# Patient Record
Sex: MALE | Race: WHITE | NOT HISPANIC OR LATINO | Employment: UNEMPLOYED | URBAN - NONMETROPOLITAN AREA
[De-identification: names, ages, dates, MRNs, and addresses within clinical notes are randomized per-mention and may not be internally consistent; named-entity substitution may affect disease eponyms.]

---

## 2021-02-17 NOTE — PROGRESS NOTES
Patient: Kaley Shannon    YOB: 1965    Date: 02/22/2021    Primary Care Provider: Cornelio Chase MD    Chief Complaint   Patient presents with   • Mass     Abdominal pain       SUBJECTIVE:    History of present illness: 3 weeks ago the patient had moderate severity epigastric abdominal pain that radiated into the back.  This lasted approximately 1 week.  He was treated with pain medication and a PPI and his symptoms slowly resolved.  Apparently he has not had any symptoms in the last 2 weeks.  No nausea or vomiting.  No lower GI complaints.  He is never had a colonoscopy or EGD in the past.    The following portions of the patient's history were reviewed and updated as appropriate: allergies, current medications, past family history, past medical history, past social history, past surgical history and problem list.      Review of Systems   Constitutional: Negative for chills, fever and unexpected weight change.   HENT: Negative for trouble swallowing and voice change.    Eyes: Negative for visual disturbance.   Respiratory: Negative for apnea, cough, chest tightness and wheezing.    Cardiovascular: Negative for chest pain, palpitations and leg swelling.   Gastrointestinal: Positive for abdominal pain. Negative for abdominal distention, anal bleeding, blood in stool, constipation, diarrhea, nausea, rectal pain and vomiting.   Endocrine: Negative for cold intolerance and heat intolerance.   Genitourinary: Negative for difficulty urinating, dysuria, flank pain and hematuria.   Musculoskeletal: Negative for back pain, gait problem and joint swelling.   Skin: Negative for color change, rash and wound.   Neurological: Negative for dizziness, syncope, speech difficulty, weakness, numbness and headaches.   Hematological: Negative for adenopathy. Does not bruise/bleed easily.   Psychiatric/Behavioral: Negative for confusion. The patient is not nervous/anxious.        Allergies:  No Known  "Allergies    Medications:    Current Outpatient Medications:   •  albuterol sulfate  (90 Base) MCG/ACT inhaler, , Disp: , Rfl:   •  cetirizine (zyrTEC) 10 MG tablet, , Disp: , Rfl:   •  Incruse Ellipta 62.5 MCG/INH aerosol powder , , Disp: , Rfl:   •  ipratropium-albuterol (DUO-NEB) 0.5-2.5 mg/3 ml nebulizer, , Disp: , Rfl:   •  montelukast (SINGULAIR) 10 MG tablet, , Disp: , Rfl:   •  pantoprazole (PROTONIX) 40 MG EC tablet, , Disp: , Rfl:   •  traMADol (ULTRAM) 50 MG tablet, , Disp: , Rfl:     History:  History reviewed. No pertinent past medical history.    History reviewed. No pertinent surgical history.    Family History   Problem Relation Age of Onset   • Cancer Mother    • Cancer Father    • Cancer Sister    • Diabetes Sister        Social History     Tobacco Use   • Smoking status: Former Smoker   • Smokeless tobacco: Never Used   Substance Use Topics   • Alcohol use: Yes     Frequency: Monthly or less   • Drug use: Never        OBJECTIVE:    Vital Signs:   Vitals:    02/22/21 1409   BP: 143/86   Pulse: 79   Temp: 98.7 °F (37.1 °C)   Weight: 85 kg (187 lb 6.3 oz)   Height: 172.7 cm (68\")       Physical Exam:   General Appearance:    Alert, cooperative, in no acute distress   Head:    Normocephalic, without obvious abnormality, atraumatic   Eyes:            Lids and lashes normal, conjunctivae and sclerae normal, no   icterus, no pallor, corneas clear, PERRLA   Ears:    Ears appear intact with no abnormalities noted   Throat:   No oral lesions, no thrush, oral mucosa moist   Neck:   No adenopathy, supple, trachea midline, no thyromegaly, no   carotid bruit, no JVD   Lungs:     Clear to auscultation,respirations regular, even and                  unlabored    Heart:    Regular rhythm and normal rate, normal S1 and S2, no            murmur, no gallop, no rub, no click   Chest Wall:    No abnormalities observed   Abdomen:     Normal bowel sounds, no masses, no organomegaly, soft        non-tender, " non-distended, no guarding, no rebound                tenderness   Extremities:   Moves all extremities well, no edema, no cyanosis, no             redness   Pulses:   Pulses palpable and equal bilaterally   Skin:   No bleeding, bruising or rash   Lymph nodes:   No palpable adenopathy   Neurologic:   Cranial nerves 2 - 12 grossly intact, sensation intact, DTR       present and equal bilaterally     Results Review:   I reviewed the patient's new clinical results.  Reviewed the CT scan including the films personally and agree with the interpretation    Review of Systems was reviewed and confirmed as accurate as documented by the MA.    ASSESSMENT/PLAN:    1. Epigastric pain    2. Abnormal CT scan, gastrointestinal tract        Patient with epigastric pain and now CT scan with a mesenteric mass.  I am not convinced that this mass was causing his pain as I would not expect his symptoms to resolve with his current medication.  He may also have a terminal ileal mass on the CT scan per the radiologist interpretation.  I recommend an EGD as well as a colonoscopy to further evaluate.  I explained the procedures to the patient as well as the risks of bleeding and perforation and they understand the ramifications of these potential complications including operative intervention and they wish to proceed.  As far as the mesenteric mass is concerned I will base any further recommendations on findings on the colonoscopy.  May repeat the CT in 3 to 6 months.    Electronically signed by Isaiah Serna MD  02/22/21

## 2021-02-22 ENCOUNTER — OFFICE VISIT (OUTPATIENT)
Dept: SURGERY | Facility: CLINIC | Age: 56
End: 2021-02-22

## 2021-02-22 VITALS
HEART RATE: 79 BPM | HEIGHT: 68 IN | WEIGHT: 187.39 LBS | DIASTOLIC BLOOD PRESSURE: 86 MMHG | SYSTOLIC BLOOD PRESSURE: 143 MMHG | TEMPERATURE: 98.7 F | BODY MASS INDEX: 28.4 KG/M2

## 2021-02-22 DIAGNOSIS — R93.3 ABNORMAL CT SCAN, GASTROINTESTINAL TRACT: ICD-10-CM

## 2021-02-22 DIAGNOSIS — R10.13 EPIGASTRIC PAIN: Primary | ICD-10-CM

## 2021-02-22 PROCEDURE — 99204 OFFICE O/P NEW MOD 45 MIN: CPT | Performed by: SURGERY

## 2021-02-22 RX ORDER — IPRATROPIUM BROMIDE AND ALBUTEROL SULFATE 2.5; .5 MG/3ML; MG/3ML
SOLUTION RESPIRATORY (INHALATION)
COMMUNITY
Start: 2021-02-17

## 2021-02-22 RX ORDER — MONTELUKAST SODIUM 10 MG/1
TABLET ORAL
COMMUNITY
Start: 2021-02-17

## 2021-02-22 RX ORDER — TRAMADOL HYDROCHLORIDE 50 MG/1
TABLET ORAL
COMMUNITY
Start: 2021-02-05

## 2021-02-22 RX ORDER — ALBUTEROL SULFATE 90 UG/1
AEROSOL, METERED RESPIRATORY (INHALATION)
COMMUNITY
Start: 2021-01-02

## 2021-02-22 RX ORDER — PANTOPRAZOLE SODIUM 40 MG/1
TABLET, DELAYED RELEASE ORAL
COMMUNITY
Start: 2021-02-04

## 2021-02-22 RX ORDER — BISACODYL 5 MG
TABLET, DELAYED RELEASE (ENTERIC COATED) ORAL
Qty: 4 TABLET | Refills: 0 | Status: SHIPPED | OUTPATIENT
Start: 2021-02-22

## 2021-02-22 RX ORDER — POLYETHYLENE GLYCOL 3350 17 G/17G
POWDER, FOR SOLUTION ORAL
Qty: 238 G | Refills: 0 | Status: SHIPPED | OUTPATIENT
Start: 2021-02-22

## 2021-02-22 RX ORDER — CETIRIZINE HYDROCHLORIDE 10 MG/1
TABLET ORAL
COMMUNITY
Start: 2021-02-17

## 2021-03-01 ENCOUNTER — OUTSIDE FACILITY SERVICE (OUTPATIENT)
Dept: SURGERY | Facility: CLINIC | Age: 56
End: 2021-03-01

## 2021-03-01 PROCEDURE — 43239 EGD BIOPSY SINGLE/MULTIPLE: CPT | Performed by: SURGERY

## 2021-03-01 PROCEDURE — 45385 COLONOSCOPY W/LESION REMOVAL: CPT | Performed by: SURGERY

## 2021-03-03 NOTE — PROGRESS NOTES
Patient: Kaley Shannon    YOB: 1965    Date: 03/08/2021    Primary Care Provider: Cornelio Chase MD    Reason for Consultation: Follow-up colonoscopy and EGD    Chief Complaint   Patient presents with   • Follow-up     EGD/colonoscopy       History of present illness: Here in follow-up after his upper and lower endoscopy.  He has a history of epigastric abdominal pain.  He is currently not complaining of any pain.  He states that since he started his PPI his pain had improved.  No lower GI complaints.    The following portions of the patient's history were reviewed and updated as appropriate: allergies, current medications, past family history, past medical history, past social history, past surgical history and problem list.    Review of Systems   Constitutional: Negative for chills, fever and unexpected weight change.   HENT: Negative for trouble swallowing and voice change.    Eyes: Negative for visual disturbance.   Respiratory: Negative for apnea, cough, chest tightness, shortness of breath and wheezing.    Cardiovascular: Negative for chest pain, palpitations and leg swelling.   Gastrointestinal: Negative for abdominal distention, abdominal pain, anal bleeding, blood in stool, constipation, diarrhea, nausea, rectal pain and vomiting.   Endocrine: Negative for cold intolerance and heat intolerance.   Genitourinary: Negative for difficulty urinating, dysuria, flank pain, scrotal swelling and testicular pain.   Musculoskeletal: Negative for back pain, gait problem and joint swelling.   Skin: Negative for color change, rash and wound.   Neurological: Negative for dizziness, syncope, speech difficulty, weakness, numbness and headaches.   Hematological: Negative for adenopathy. Does not bruise/bleed easily.   Psychiatric/Behavioral: Negative for confusion. The patient is not nervous/anxious.        Allergies:  No Known Allergies    Medications:    Current Outpatient Medications:   •   "albuterol sulfate  (90 Base) MCG/ACT inhaler, , Disp: , Rfl:   •  bisacodyl (bisacodyl) 5 MG EC tablet, Take 2 at 3pm and 2 at 7pm the day prior to colonoscopy., Disp: 4 tablet, Rfl: 0  •  cetirizine (zyrTEC) 10 MG tablet, , Disp: , Rfl:   •  Incruse Ellipta 62.5 MCG/INH aerosol powder , , Disp: , Rfl:   •  ipratropium-albuterol (DUO-NEB) 0.5-2.5 mg/3 ml nebulizer, , Disp: , Rfl:   •  montelukast (SINGULAIR) 10 MG tablet, , Disp: , Rfl:   •  pantoprazole (PROTONIX) 40 MG EC tablet, , Disp: , Rfl:   •  polyethylene glycol (MIRALAX) 17 GM/SCOOP powder, Mix 238g powder with 64 oz of clear liquid. Starting at 5pm drink 80z every 10-15 minutes until consumed., Disp: 238 g, Rfl: 0  •  traMADol (ULTRAM) 50 MG tablet, , Disp: , Rfl:     Vital Signs:  Vitals:    03/08/21 1316   BP: 119/69   Pulse: 97   Temp: 98.9 °F (37.2 °C)   TempSrc: Temporal   SpO2: 97%   Weight: 86 kg (189 lb 9.5 oz)   Height: 172.7 cm (68\")     History:  History reviewed. No pertinent past medical history.    No past surgical history on file.    Family History   Problem Relation Age of Onset   • Cancer Mother    • Cancer Father    • Cancer Sister    • Diabetes Sister        Social History     Tobacco Use   • Smoking status: Former Smoker   • Smokeless tobacco: Never Used   Substance Use Topics   • Alcohol use: Yes   • Drug use: Never        Physical Exam:   General Appearance:    Alert, cooperative, in no acute distress   Lungs:     Clear to auscultation,respirations regular, even and                  unlabored    Heart:    Regular rhythm and normal rate, normal S1 and S2, no            murmur   Abdomen:     Normal bowel sounds, no masses, no organomegaly, soft        non-tender, non-distended, no guarding,    Neurologic:   Normal without gross deficits.   Psychiatric: No evidence of depression or anxiety          Results Review:   I reviewed the patient's new clinical results.  Pathology was reviewed    Assessment / Plan:    1. H. pylori " infection    2. Adenomatous polyp of colon, unspecified part of colon    3. Epigastric pain    4. Mesenteric mass        I will treat for his H. pylori.  Also check a breath test 6 weeks after that.  Clear as to whether or not this is contributing to his symptoms as the PPI seems to have helped.  I recommend that he finish a 6-week course of PPI therapy and then stop and again we will check a breath test to check for eradication.    Repeat colonoscopy in 5 years.    CT scan again in 6 months to reevaluate the mesenteric mass which may be enlarged lymph node.  I do not think this is contributing to his pain incidentally.  Nothing on endoscopy    Electronically signed by Isaiah Serna MD  03/08/21

## 2021-03-08 ENCOUNTER — OFFICE VISIT (OUTPATIENT)
Dept: SURGERY | Facility: CLINIC | Age: 56
End: 2021-03-08

## 2021-03-08 VITALS
WEIGHT: 189.6 LBS | HEIGHT: 68 IN | TEMPERATURE: 98.9 F | SYSTOLIC BLOOD PRESSURE: 119 MMHG | DIASTOLIC BLOOD PRESSURE: 69 MMHG | BODY MASS INDEX: 28.73 KG/M2 | OXYGEN SATURATION: 97 % | HEART RATE: 97 BPM

## 2021-03-08 DIAGNOSIS — R10.13 EPIGASTRIC PAIN: ICD-10-CM

## 2021-03-08 DIAGNOSIS — D12.6 ADENOMATOUS POLYP OF COLON, UNSPECIFIED PART OF COLON: ICD-10-CM

## 2021-03-08 DIAGNOSIS — A04.8 H. PYLORI INFECTION: Primary | ICD-10-CM

## 2021-03-08 DIAGNOSIS — K63.89 MESENTERIC MASS: ICD-10-CM

## 2021-03-08 PROCEDURE — 99214 OFFICE O/P EST MOD 30 MIN: CPT | Performed by: SURGERY

## 2021-03-08 RX ORDER — LANSOPRAZOLE, AMOXICILLIN, CLARITHROMYCIN 30-500-500
KIT ORAL 2 TIMES DAILY
Qty: 1 KIT | Refills: 0 | Status: SHIPPED | OUTPATIENT
Start: 2021-03-08

## 2021-04-29 DIAGNOSIS — A04.8 H. PYLORI INFECTION: Primary | ICD-10-CM

## 2021-04-30 ENCOUNTER — TELEPHONE (OUTPATIENT)
Dept: SURGERY | Facility: CLINIC | Age: 56
End: 2021-04-30

## 2021-09-24 NOTE — PROGRESS NOTES
Patient: Kaley Shannon  YOB: 1965    Date: 09/27/2021    Primary Care Provider: Cornelio Chase MD    Chief Complaint   Patient presents with   • Follow-up     CT Scan Results        History: Patient states that he is significantly improved since his last visit.  He will occasionally have some nonspecific abdominal pain but this is intermittent and mild.  He does complain of some belching issues and bloating.  No weight loss.  No lower GI    The following portions of the patient's history were reviewed and updated as appropriate: allergies, current medications, past family history, past medical history, past social history, past surgical history and problem list.    Review of Systems   Constitutional: Negative for chills, fever and unexpected weight change.   HENT: Negative for trouble swallowing and voice change.    Eyes: Negative for visual disturbance.   Respiratory: Negative for apnea, cough, chest tightness, shortness of breath and wheezing.    Cardiovascular: Negative for chest pain, palpitations and leg swelling.   Gastrointestinal: Positive for abdominal pain. Negative for abdominal distention, anal bleeding, blood in stool, constipation, diarrhea, nausea, rectal pain and vomiting.   Endocrine: Negative for cold intolerance and heat intolerance.   Genitourinary: Negative for difficulty urinating, dysuria, flank pain, scrotal swelling and testicular pain.   Musculoskeletal: Negative for back pain, gait problem and joint swelling.   Skin: Negative for color change, rash and wound.   Neurological: Negative for dizziness, syncope, speech difficulty, weakness, numbness and headaches.   Hematological: Negative for adenopathy. Does not bruise/bleed easily.   Psychiatric/Behavioral: Negative for confusion. The patient is not nervous/anxious.        Vital Signs  Vitals:    09/27/21 1414   BP: 121/72   Pulse: 78   Temp: 99.2 °F (37.3 °C)   SpO2: 99%   Weight: 88.6 kg (195 lb 6.4 oz)   Height:  "172.7 cm (68\")       Allergies:  No Known Allergies    Medications:    Current Outpatient Medications:   •  albuterol sulfate  (90 Base) MCG/ACT inhaler, , Disp: , Rfl:   •  amoxicillin-clarithromycin-lansoprazole (Prevpac) combo pack, Take  by mouth 2 (Two) Times a Day., Disp: 1 kit, Rfl: 0  •  bisacodyl (bisacodyl) 5 MG EC tablet, Take 2 at 3pm and 2 at 7pm the day prior to colonoscopy., Disp: 4 tablet, Rfl: 0  •  cetirizine (zyrTEC) 10 MG tablet, , Disp: , Rfl:   •  Incruse Ellipta 62.5 MCG/INH aerosol powder , , Disp: , Rfl:   •  ipratropium-albuterol (DUO-NEB) 0.5-2.5 mg/3 ml nebulizer, , Disp: , Rfl:   •  montelukast (SINGULAIR) 10 MG tablet, , Disp: , Rfl:   •  pantoprazole (PROTONIX) 40 MG EC tablet, , Disp: , Rfl:   •  polyethylene glycol (MIRALAX) 17 GM/SCOOP powder, Mix 238g powder with 64 oz of clear liquid. Starting at 5pm drink 80z every 10-15 minutes until consumed., Disp: 238 g, Rfl: 0  •  traMADol (ULTRAM) 50 MG tablet, , Disp: , Rfl:     Physical Exam:  General Appearance:    Alert, cooperative, in no acute distress    Heart:    Regular rhythm and normal rate,    Abdomen:     Normal bowel sounds, no masses, no organomegaly, soft        non-tender, non-distended, no guarding,         Results Review:   I reviewed the patient's new clinical results. CT including films were reviewed and I agree with the interpretation    Review of Systems was reviewed and confirmed as accurate as documented by the MA.    ASSESSMENT/PLAN:    1. Mesenteric mass    2. H. pylori infection       He has not done the H. pylori stool test yet in follow-up of his treatment for the H. pylori.  He will go get that done.  CT scan reveals that this mesenteric mass is smaller than on previous exam and I think this is consistent with benign etiology especially since it has gotten smaller rather than increased in size from 7 months earlier.  I do not believe this is causing him any symptoms.  I will have him follow-up with me " as needed.    Electronically signed by Isaiah Serna MD  09/27/21

## 2021-09-27 ENCOUNTER — OFFICE VISIT (OUTPATIENT)
Dept: SURGERY | Facility: CLINIC | Age: 56
End: 2021-09-27

## 2021-09-27 VITALS
WEIGHT: 195.4 LBS | DIASTOLIC BLOOD PRESSURE: 72 MMHG | OXYGEN SATURATION: 99 % | BODY MASS INDEX: 29.61 KG/M2 | TEMPERATURE: 99.2 F | SYSTOLIC BLOOD PRESSURE: 121 MMHG | HEART RATE: 78 BPM | HEIGHT: 68 IN

## 2021-09-27 DIAGNOSIS — A04.8 H. PYLORI INFECTION: ICD-10-CM

## 2021-09-27 DIAGNOSIS — K63.89 MESENTERIC MASS: Primary | ICD-10-CM

## 2021-09-27 PROCEDURE — 99213 OFFICE O/P EST LOW 20 MIN: CPT | Performed by: SURGERY

## 2021-10-06 ENCOUNTER — TELEPHONE (OUTPATIENT)
Dept: SURGERY | Facility: CLINIC | Age: 56
End: 2021-10-06

## 2021-10-07 ENCOUNTER — TELEPHONE (OUTPATIENT)
Dept: SURGERY | Facility: CLINIC | Age: 56
End: 2021-10-07

## 2021-10-20 ENCOUNTER — TELEPHONE (OUTPATIENT)
Dept: SURGERY | Facility: CLINIC | Age: 56
End: 2021-10-20

## 2021-10-20 NOTE — TELEPHONE ENCOUNTER
Called patient advised that his H. Pylori test was negative, If he has any additional question he can call back.